# Patient Record
Sex: FEMALE | URBAN - METROPOLITAN AREA
[De-identification: names, ages, dates, MRNs, and addresses within clinical notes are randomized per-mention and may not be internally consistent; named-entity substitution may affect disease eponyms.]

---

## 2018-01-01 ENCOUNTER — APPOINTMENT (RX ONLY)
Dept: URBAN - METROPOLITAN AREA CLINIC 57 | Facility: CLINIC | Age: 68
Setting detail: DERMATOLOGY
End: 2018-01-01

## 2018-01-01 DIAGNOSIS — D22 MELANOCYTIC NEVI: ICD-10-CM

## 2018-01-01 DIAGNOSIS — D18.0 HEMANGIOMA: ICD-10-CM

## 2018-01-01 DIAGNOSIS — L82.1 OTHER SEBORRHEIC KERATOSIS: ICD-10-CM

## 2018-01-01 DIAGNOSIS — L81.4 OTHER MELANIN HYPERPIGMENTATION: ICD-10-CM

## 2018-01-01 DIAGNOSIS — L21.8 OTHER SEBORRHEIC DERMATITIS: ICD-10-CM

## 2018-01-01 DIAGNOSIS — C84.A CUTANEOUS T-CELL LYMPHOMA, UNSPECIFIED: ICD-10-CM

## 2018-01-01 PROCEDURE — 99214 OFFICE O/P EST MOD 30 MIN: CPT | Mod: 25

## 2018-01-01 PROCEDURE — ? COUNSELING

## 2018-01-01 PROCEDURE — ? PRESCRIPTION

## 2018-01-01 PROCEDURE — ? PRESCRIPTION SAMPLES PROVIDED

## 2018-01-01 PROCEDURE — 11101: CPT

## 2018-01-01 PROCEDURE — ? BIOPSY BY SHAVE METHOD

## 2018-01-01 PROCEDURE — 11100: CPT

## 2018-01-01 RX ORDER — TRIAMCINOLONE ACETONIDE 1 MG/G
CREAM TOPICAL
Qty: 1 | Refills: 3 | Status: ERX | COMMUNITY
Start: 2018-01-01

## 2018-01-01 RX ADMIN — TRIAMCINOLONE ACETONIDE: 1 CREAM TOPICAL at 12:38

## 2018-01-01 ASSESSMENT — LOCATION ZONE DERM
LOCATION ZONE: ARM
LOCATION ZONE: TRUNK
LOCATION ZONE: LEG

## 2018-01-01 ASSESSMENT — LOCATION SIMPLE DESCRIPTION DERM
LOCATION SIMPLE: RIGHT FOREARM
LOCATION SIMPLE: ABDOMEN
LOCATION SIMPLE: LEFT CALF
LOCATION SIMPLE: LEFT FOREARM

## 2018-01-01 ASSESSMENT — LOCATION DETAILED DESCRIPTION DERM
LOCATION DETAILED: LEFT PROXIMAL DORSAL FOREARM
LOCATION DETAILED: EPIGASTRIC SKIN
LOCATION DETAILED: RIGHT VENTRAL PROXIMAL FOREARM
LOCATION DETAILED: LEFT DISTAL CALF

## 2018-03-16 PROBLEM — Z85.79 PERSONAL HISTORY OF OTHER MALIGNANT NEOPLASMS OF LYMPHOID, HEMATOPOIETIC AND RELATED TISSUES: Status: ACTIVE | Noted: 2018-01-01

## 2018-03-16 PROBLEM — D48.5 NEOPLASM OF UNCERTAIN BEHAVIOR OF SKIN: Status: ACTIVE | Noted: 2018-01-01

## 2018-03-16 PROBLEM — D18.01 HEMANGIOMA OF SKIN AND SUBCUTANEOUS TISSUE: Status: ACTIVE | Noted: 2018-01-01

## 2018-03-16 PROBLEM — Z85.3 PERSONAL HISTORY OF MALIGNANT NEOPLASM OF BREAST: Status: ACTIVE | Noted: 2018-01-01

## 2018-03-16 PROBLEM — L82.1 OTHER SEBORRHEIC KERATOSIS: Status: ACTIVE | Noted: 2018-01-01

## 2018-03-16 PROBLEM — L21.8 OTHER SEBORRHEIC DERMATITIS: Status: ACTIVE | Noted: 2018-01-01

## 2018-03-16 PROBLEM — L81.4 OTHER MELANIN HYPERPIGMENTATION: Status: ACTIVE | Noted: 2018-01-01

## 2018-03-16 PROBLEM — L30.9 DERMATITIS, UNSPECIFIED: Status: ACTIVE | Noted: 2018-01-01

## 2018-03-16 PROBLEM — D22.9 MELANOCYTIC NEVI, UNSPECIFIED: Status: ACTIVE | Noted: 2018-01-01

## 2018-03-16 NOTE — PROCEDURE: PRESCRIPTION SAMPLES PROVIDED
Lot/Batch Number (Optional): [id number]
Detail Level: Zone
Expiration Date (Optional): 3/2019.
Samples Given: Hylatopic